# Patient Record
Sex: MALE | Race: WHITE | NOT HISPANIC OR LATINO | URBAN - METROPOLITAN AREA
[De-identification: names, ages, dates, MRNs, and addresses within clinical notes are randomized per-mention and may not be internally consistent; named-entity substitution may affect disease eponyms.]

---

## 2018-02-19 ENCOUNTER — OFFICE (OUTPATIENT)
Dept: URBAN - METROPOLITAN AREA CLINIC 45 | Facility: CLINIC | Age: 68
End: 2018-02-19

## 2018-02-19 VITALS
WEIGHT: 198 LBS | TEMPERATURE: 97.2 F | HEART RATE: 84 BPM | DIASTOLIC BLOOD PRESSURE: 81 MMHG | SYSTOLIC BLOOD PRESSURE: 145 MMHG | HEIGHT: 69 IN

## 2018-02-19 DIAGNOSIS — G60.8 OTHER SPECIFIED IDIOPATHIC PERIPHERAL NEUROPATHY: ICD-10-CM

## 2018-02-19 DIAGNOSIS — I10 HYPERTENSION: ICD-10-CM

## 2018-02-19 DIAGNOSIS — K21.9 GERD: ICD-10-CM

## 2018-02-19 PROCEDURE — 99203 OFFICE O/P NEW LOW 30 MIN: CPT | Performed by: INTERNAL MEDICINE

## 2018-02-19 NOTE — SERVICEHPINOTES
The patient is seen for the evaluation of suspected GERD.    Noted the onset of   heartburn, chronic cough and frequent need to clear the throat  many  years   ago  .   Symptoms have been occurring   several   time(s) per   week  .    During a given day, they are most prevalent   shortly after eating meals, at random times of the day and in the middle of the night  .    They are exacerbated by   eating meals late at night and eating fatty/greasy foods  .   In the past, the patient has tried   Nexium, Prevacid and OTC H2 blockers  .   Currently takes   OTC H2 blockers   dosed   every day  .   On this therapy, symptom response has been   good  .    Associated symptoms include   chronic cough and frequent need to clear the throat  .      Has also noted atypical (non-esophageal) symptoms including   none  .    A prior EGD has been performed and showed   2003, NA  .   No dysphagia,n/v. No wt loss, fever or chills. No blood in stool or black stool. Apparently had Cologuard performed by Dr Perez which was neg.

## 2018-03-12 ENCOUNTER — AMBULATORY SURGICAL CENTER (OUTPATIENT)
Dept: URBAN - METROPOLITAN AREA SURGERY 37 | Facility: SURGERY | Age: 68
End: 2018-03-12

## 2018-03-12 VITALS
TEMPERATURE: 98.6 F | OXYGEN SATURATION: 98 % | RESPIRATION RATE: 15 BRPM | DIASTOLIC BLOOD PRESSURE: 84 MMHG | WEIGHT: 195 LBS | HEART RATE: 81 BPM | SYSTOLIC BLOOD PRESSURE: 146 MMHG | HEIGHT: 69 IN

## 2018-03-12 DIAGNOSIS — K21.9 GASTRO-ESOPHAGEAL REFLUX DISEASE WITHOUT ESOPHAGITIS: ICD-10-CM

## 2018-03-12 DIAGNOSIS — K44.9 DIAPHRAGMATIC HERNIA WITHOUT OBSTRUCTION OR GANGRENE: ICD-10-CM

## 2018-03-12 DIAGNOSIS — K29.70 GASTRITIS, UNSPECIFIED, WITHOUT BLEEDING: ICD-10-CM

## 2018-03-12 DIAGNOSIS — K31.89 OTHER DISEASES OF STOMACH AND DUODENUM: ICD-10-CM

## 2018-03-12 LAB — SURGICAL: PDF REPORT: (no result)

## 2018-03-12 PROCEDURE — 43239 EGD BIOPSY SINGLE/MULTIPLE: CPT | Performed by: INTERNAL MEDICINE

## 2018-03-12 NOTE — SERVICEHPINOTES
The patient is seen for the evaluation of suspected GERD. Noted the onset of heartburn, chronic cough and frequent need to clear the throat many years ago. Symptoms have been occurring several time(s) per week. During a given day, they are most prevalent shortly after eating meals, at random times of the day and in the middle of the night. They are exacerbated by eating meals late at night and eating fatty/greasy foods. In the past, the patient has tried Nexium, Prevacid and OTC H2 blockers. Currently takes OTC H2 blockers dosed every day. On this therapy, symptom response has been good. Associated symptoms include chronic cough and frequent need to clear the throat. Has also noted atypical (non-esophageal) symptoms including none. A prior EGD has been performed and showed 2003, NA. No dysphagia,n/v. No wt loss, fever or chills. No blood in stool or black stool. Apparently had Cologuard performed by Dr Perez which was neg.

## 2023-04-07 ENCOUNTER — HOSPITAL ENCOUNTER (OUTPATIENT)
Dept: HOSPITAL 54 - WOU | Age: 73
Discharge: HOME | End: 2023-04-07
Attending: PODIATRIST
Payer: MEDICARE

## 2023-04-07 DIAGNOSIS — I10: ICD-10-CM

## 2023-04-07 DIAGNOSIS — G90.09: Primary | ICD-10-CM

## 2023-04-07 DIAGNOSIS — M19.90: ICD-10-CM

## 2023-04-07 DIAGNOSIS — M79.671: ICD-10-CM

## 2023-04-07 DIAGNOSIS — M79.672: ICD-10-CM

## 2023-04-07 PROCEDURE — G0463 HOSPITAL OUTPT CLINIC VISIT: HCPCS
